# Patient Record
Sex: MALE | Race: WHITE | HISPANIC OR LATINO | URBAN - METROPOLITAN AREA
[De-identification: names, ages, dates, MRNs, and addresses within clinical notes are randomized per-mention and may not be internally consistent; named-entity substitution may affect disease eponyms.]

---

## 2020-09-03 ENCOUNTER — OFFICE VISIT (OUTPATIENT)
Dept: URBAN - METROPOLITAN AREA CLINIC 63 | Facility: CLINIC | Age: 50
End: 2020-09-03

## 2020-09-04 ENCOUNTER — OFFICE VISIT (OUTPATIENT)
Dept: URBAN - METROPOLITAN AREA CLINIC 63 | Facility: CLINIC | Age: 50
End: 2020-09-04

## 2021-04-20 ENCOUNTER — OFFICE VISIT (OUTPATIENT)
Dept: URBAN - METROPOLITAN AREA CLINIC 63 | Facility: CLINIC | Age: 51
End: 2021-04-20

## 2021-04-28 LAB
BASO (ABSOLUTE): (no result)
BASOS: (no result)
EOS (ABSOLUTE): (no result)
EOS: (no result)
FERRITIN, SERUM: (no result)
FOLATE (FOLIC ACID), SERUM: (no result)
HEMATOCRIT: (no result)
HEMATOLOGY COMMENTS:: (no result)
HEMOGLOBIN: (no result)
HEP B CORE AB, IGM: NEGATIVE
IMMATURE CELLS: (no result)
IMMATURE GRANS (ABS): (no result)
IMMATURE GRANULOCYTES: (no result)
IRON BIND.CAP.(TIBC): (no result)
IRON SATURATION: (no result)
IRON: (no result)
LYMPHS (ABSOLUTE): (no result)
LYMPHS: (no result)
Lab: (no result)
MCH: (no result)
MCHC: (no result)
MCV: (no result)
MONOCYTES(ABSOLUTE): (no result)
MONOCYTES: (no result)
NEUTROPHILS (ABSOLUTE): (no result)
NEUTROPHILS: (no result)
NRBC: (no result)
PLATELETS: (no result)
QUANTIFERON CRITERIA: (no result)
QUANTIFERON INCUBATION: (no result)
QUANTIFERON MITOGEN VALUE: (no result)
QUANTIFERON NIL VALUE: (no result)
QUANTIFERON TB1 AG VALUE: (no result)
QUANTIFERON TB2 AG VALUE: (no result)
QUANTIFERON-TB GOLD PLUS: NEGATIVE
RBC: (no result)
RDW: (no result)
UIBC: (no result)
VITAMIN B12: (no result)
WBC: (no result)

## 2021-05-03 ENCOUNTER — LAB OUTSIDE AN ENCOUNTER (OUTPATIENT)
Dept: URBAN - METROPOLITAN AREA CLINIC 121 | Facility: CLINIC | Age: 51
End: 2021-05-03

## 2021-05-08 LAB
C DIFFICILE TOXINS A+B, EIA: NEGATIVE
CALPROTECTIN, FECAL: (no result)
CAMPYLOBACTER CULTURE: (no result)
E COLI SHIGA TOXIN EIA: NEGATIVE
Lab: (no result)
OVA + PARASITE EXAM: (no result)
SALMONELLA/SHIGELLA SCREEN: (no result)
WHITE BLOOD CELLS (WBC), STOOL: (no result)

## 2021-06-14 ENCOUNTER — OFFICE VISIT (OUTPATIENT)
Dept: URBAN - METROPOLITAN AREA SURGERY CENTER 4 | Facility: SURGERY CENTER | Age: 51
End: 2021-06-14

## 2021-06-16 LAB — PATHOLOGY (INDENTED REPORT): (no result)

## 2021-08-06 ENCOUNTER — OFFICE VISIT (OUTPATIENT)
Dept: URBAN - METROPOLITAN AREA CLINIC 63 | Facility: CLINIC | Age: 51
End: 2021-08-06

## 2021-09-17 ENCOUNTER — OFFICE VISIT (OUTPATIENT)
Dept: URBAN - METROPOLITAN AREA CLINIC 63 | Facility: CLINIC | Age: 51
End: 2021-09-17

## 2021-10-22 ENCOUNTER — OFFICE VISIT (OUTPATIENT)
Dept: URBAN - METROPOLITAN AREA CLINIC 63 | Facility: CLINIC | Age: 51
End: 2021-10-22

## 2021-12-20 LAB
A/G RATIO: 1.8
ALBUMIN: (no result)
ALKALINE PHOSPHATASE: (no result)
ALT (SGPT): (no result)
ANTI-USTEKINUMAB ANTIBODY: (no result)
AST (SGOT): (no result)
BASO (ABSOLUTE): (no result)
BASOS: (no result)
BILIRUBIN, TOTAL: (no result)
BUN/CREATININE RATIO: 13
BUN: (no result)
C-REACTIVE PROTEIN, QUANT: (no result)
CALCIUM: (no result)
CARBON DIOXIDE, TOTAL: (no result)
CHLORIDE: (no result)
CREATININE: (no result)
EGFR IF AFRICN AM: (no result)
EGFR IF NONAFRICN AM: (no result)
EOS (ABSOLUTE): (no result)
EOS: (no result)
GLOBULIN, TOTAL: (no result)
GLUCOSE: (no result)
HEMATOCRIT: (no result)
HEMATOLOGY COMMENTS:: (no result)
HEMOGLOBIN: (no result)
IMMATURE CELLS: (no result)
IMMATURE GRANS (ABS): (no result)
IMMATURE GRANULOCYTES: (no result)
LYMPHS (ABSOLUTE): (no result)
LYMPHS: (no result)
MCH: (no result)
MCHC: (no result)
MCV: (no result)
MONOCYTES(ABSOLUTE): (no result)
MONOCYTES: (no result)
NEUTROPHILS (ABSOLUTE): (no result)
NEUTROPHILS: (no result)
NRBC: (no result)
PLATELETS: (no result)
POTASSIUM: (no result)
PROTEIN, TOTAL: (no result)
RBC: (no result)
RDW: (no result)
SODIUM: (no result)
USTEKINUMAB: (no result)
WBC: (no result)

## 2022-01-07 ENCOUNTER — OFFICE VISIT (OUTPATIENT)
Dept: URBAN - METROPOLITAN AREA CLINIC 63 | Facility: CLINIC | Age: 52
End: 2022-01-07

## 2022-02-17 LAB
BASO (ABSOLUTE): (no result)
BASOS: (no result)
EOS (ABSOLUTE): (no result)
EOS: (no result)
FERRITIN: (no result)
FOLATE (FOLIC ACID), SERUM: (no result)
HEMATOCRIT: (no result)
HEMATOLOGY COMMENTS:: (no result)
HEMOGLOBIN: (no result)
IMMATURE CELLS: (no result)
IMMATURE GRANS (ABS): (no result)
IMMATURE GRANULOCYTES: (no result)
IRON BIND.CAP.(TIBC): (no result)
IRON SATURATION: (no result)
IRON: (no result)
LYMPHS (ABSOLUTE): (no result)
LYMPHS: (no result)
MCH: (no result)
MCHC: (no result)
MCV: (no result)
MONOCYTES(ABSOLUTE): (no result)
MONOCYTES: (no result)
NEUTROPHILS (ABSOLUTE): (no result)
NEUTROPHILS: (no result)
NRBC: (no result)
PLATELETS: (no result)
RBC: (no result)
RDW: (no result)
UIBC: (no result)
VITAMIN B12: (no result)
WBC: (no result)

## 2022-02-18 ENCOUNTER — OFFICE VISIT (OUTPATIENT)
Dept: URBAN - METROPOLITAN AREA CLINIC 63 | Facility: CLINIC | Age: 52
End: 2022-02-18

## 2022-07-09 ENCOUNTER — TELEPHONE ENCOUNTER (OUTPATIENT)
Dept: URBAN - METROPOLITAN AREA CLINIC 121 | Facility: CLINIC | Age: 52
End: 2022-07-09

## 2022-07-09 RX ORDER — LACTOBACILLUS COMBINATION NO.4 3B CELL
CAPSULE ORAL
Refills: 0 | OUTPATIENT
Start: 2014-06-02 | End: 2015-09-08

## 2022-07-09 RX ORDER — TRAMADOL HYDROCHLORIDE 50 MG/1
TABLET ORAL AS NEEDED
Refills: 0 | OUTPATIENT
Start: 2015-11-04 | End: 2016-05-18

## 2022-07-09 RX ORDER — PANCRELIPASE LIPASE, PANCRELIPASE PROTEASE, PANCRELIPASE AMYLASE 40000; 126000; 168000 [USP'U]/1; [USP'U]/1; [USP'U]/1
USE AS DIRECTED TAKE 2 CAPSULES WITH EACH MEAL AND 1 WITH SNACKS UP TO 8 CAPSULES PER DAY CAPSULE, DELAYED RELEASE ORAL
Refills: 2 | OUTPATIENT
Start: 2020-01-09 | End: 2020-09-04

## 2022-07-09 RX ORDER — LISINOPRIL 10 MG/1
TABLET ORAL AS NEEDED
Refills: 0 | OUTPATIENT
Start: 2021-04-20 | End: 2021-08-06

## 2022-07-09 RX ORDER — ONDANSETRON HYDROCHLORIDE 4 MG/1
THREE TIMES A DAY AS NEEDED FOR NAUSEA TABLET, FILM COATED ORAL THREE TIMES A DAY
Refills: 2 | OUTPATIENT
Start: 2021-04-20 | End: 2022-02-21

## 2022-07-09 RX ORDER — TRAMADOL HYDROCHLORIDE 50 MG/1
TABLET ORAL AS NEEDED
Refills: 0 | OUTPATIENT
Start: 2016-05-18 | End: 2021-04-20

## 2022-07-09 RX ORDER — MESALAMINE 1.2 G/1
2 TWICE A DAY TABLET, DELAYED RELEASE ORAL
Refills: 2 | OUTPATIENT
Start: 2021-04-20 | End: 2021-08-06

## 2022-07-09 RX ORDER — LISINOPRIL 10 MG/1
TABLET ORAL AS NEEDED
Refills: 0 | OUTPATIENT
Start: 2021-08-06 | End: 2021-09-17

## 2022-07-09 RX ORDER — TRAMADOL HYDROCHLORIDE 50 MG/1
TABLET ORAL AS NEEDED
Refills: 0 | OUTPATIENT
Start: 2021-08-06 | End: 2021-09-17

## 2022-07-09 RX ORDER — USTEKINUMAB 90 MG/ML
USE AS DIRECTEDINJ 1-90MG PFS SC EVERY 8 WEEKS INJECTION, SOLUTION SUBCUTANEOUS
Refills: 4 | OUTPATIENT
Start: 2021-08-09 | End: 2022-01-10

## 2022-07-09 RX ORDER — ADALIMUMAB 40MG/0.8ML
USE AS DIRECTED KIT SUBCUTANEOUS
Refills: 6 | OUTPATIENT
Start: 2016-06-29 | End: 2016-09-14

## 2022-07-09 RX ORDER — ADALIMUMAB 40MG/0.8ML
INJECT EVERY OTHER WEEK KIT SUBCUTANEOUS
Refills: 3 | OUTPATIENT
Start: 2015-10-28 | End: 2015-11-04

## 2022-07-09 RX ORDER — MELOXICAM 15 MG/1
ONCE A DAY TABLET ORAL ONCE A DAY
Refills: 0 | OUTPATIENT
Start: 2017-03-08 | End: 2017-03-08

## 2022-07-09 RX ORDER — DICYCLOMINE HYDROCHLORIDE 20 MG/1
TAKE 1 TABLET BY MOUTH THREE TIMES DAILY TABLET ORAL
Refills: 11 | OUTPATIENT
Start: 2020-11-09 | End: 2021-11-17

## 2022-07-09 RX ORDER — TRAMADOL HYDROCHLORIDE 50 MG/1
TABLET ORAL AS NEEDED
Refills: 0 | OUTPATIENT
Start: 2021-09-17 | End: 2021-10-22

## 2022-07-09 RX ORDER — USTEKINUMAB 90 MG/ML
USE AS DIRECTEDINJ 1-90MG PFS SC EVERY 4  WEEKS INJECTION, SOLUTION SUBCUTANEOUS
Refills: 6 | OUTPATIENT
Start: 2022-01-10 | End: 2022-01-11

## 2022-07-09 RX ORDER — TRAMADOL HYDROCHLORIDE 50 MG/1
TABLET ORAL TAKE AS DIRECTED
Refills: 0 | OUTPATIENT
Start: 2015-01-22 | End: 2015-09-08

## 2022-07-09 RX ORDER — ADALIMUMAB 40MG/0.8ML
ONE INJECTION EVERY OTHER WEEK KIT SUBCUTANEOUS
Refills: 3 | OUTPATIENT
Start: 2015-10-28 | End: 2015-10-28

## 2022-07-09 RX ORDER — LISINOPRIL 10 MG/1
TABLET ORAL AS NEEDED
Refills: 0 | OUTPATIENT
Start: 2021-10-22 | End: 2022-01-07

## 2022-07-09 RX ORDER — PREDNISONE 10 MG/1
TAKE 2 TAB/DAYWHEN INSTRUCTED TO BEGIN TAPER: TAKE 1 TAB DAILY X 7 DAYS, THEN 1/2 TAB X 7 DAYS, THEN 1/2 TAB EVERY OTHER DAY X 10 DAYS TABLET ORAL
Refills: 0 | OUTPATIENT
Start: 2017-03-31 | End: 2019-10-08

## 2022-07-09 RX ORDER — VITAM B12 100 MCG
TAB ORAL ONCE A DAY
Refills: 0 | OUTPATIENT
Start: 2021-10-22 | End: 2022-01-07

## 2022-07-09 RX ORDER — LACTOBACILLUS COMBINATION NO.4 3B CELL
CAPSULE ORAL ONCE A DAY
Refills: 0 | OUTPATIENT
Start: 2015-09-08 | End: 2015-11-04

## 2022-07-09 RX ORDER — TRAMADOL HYDROCHLORIDE 50 MG/1
TABLET ORAL AS NEEDED
Refills: 0 | OUTPATIENT
Start: 2021-04-20 | End: 2021-08-06

## 2022-07-09 RX ORDER — PREDNISONE 10 MG/1
CONTINUE TAPER 3 TAB/DAY FOR 7 DAYS, 2 TAB/DAY FOR 7 DAYS, 1 TAB/DAY FOR 7 DAYS, 1/2 TAB/DAY FOR 7 DAYS TABLET ORAL
Refills: 0 | OUTPATIENT
Start: 2016-10-18 | End: 2019-10-08

## 2022-07-09 RX ORDER — ASPIRIN/ACETAMINOPHEN/CAFFEINE 250-250-65
TABLET ORAL ONCE A DAY
Refills: 0 | OUTPATIENT
Start: 2016-05-18 | End: 2016-05-18

## 2022-07-09 RX ORDER — TIZANIDINE 2 MG/1
TABLET ORAL ONCE A DAY
Refills: 0 | OUTPATIENT
Start: 2021-12-27 | End: 2022-01-07

## 2022-07-09 RX ORDER — PREDNISONE 10 MG/1
4 TAB(40 MG) PO DAILY FOR 1 WEEK AND 3 TABS (30 MG ) PO DAILY FOR 1 WEEK AND 2 TABS(20 MG) PO DAILY FOR 1 WEEK AND THEN TAPER DOWN 5 MG EVERY WEEK AS DIRECTED TABLET ORAL
Refills: 3 | OUTPATIENT
Start: 2014-06-12 | End: 2015-01-22

## 2022-07-09 RX ORDER — TRAMADOL HYDROCHLORIDE 50 MG/1
TABLET ORAL AS NEEDED
Refills: 0 | OUTPATIENT
Start: 2021-10-22 | End: 2022-01-07

## 2022-07-09 RX ORDER — PREDNISONE 10 MG/1
TAKE 3 TABS/DAY X 1 WEEK, 2 TABS/DAY X 1 WEEK, 1 TABS/DAY X 1 WEEK TABLET ORAL
Refills: 0 | OUTPATIENT
Start: 2017-02-08 | End: 2017-03-03

## 2022-07-09 RX ORDER — ONDANSETRON HYDROCHLORIDE 4 MG/1
THREE TIMES A DAY AS NEEDED FOR NAUSEA TABLET, FILM COATED ORAL THREE TIMES A DAY
Refills: 11 | OUTPATIENT
Start: 2020-03-30 | End: 2021-04-15

## 2022-07-09 RX ORDER — PREDNISONE 20 MG/1
TABLET ORAL TWICE A DAY
Refills: 0 | OUTPATIENT
Start: 2015-01-22 | End: 2015-02-19

## 2022-07-09 RX ORDER — VITAM B12 100 MCG
TAB ORAL ONCE A DAY
Refills: 0 | OUTPATIENT
Start: 2021-09-17 | End: 2021-10-22

## 2022-07-09 RX ORDER — DICYCLOMINE HYDROCHLORIDE 20 MG/1
THREE TIMES A DAY TABLET ORAL THREE TIMES A DAY
Refills: 2 | OUTPATIENT
Start: 2019-11-08 | End: 2020-11-09

## 2022-07-09 RX ORDER — LISINOPRIL 10 MG/1
TABLET ORAL
Refills: 0 | OUTPATIENT
Start: 2019-10-08 | End: 2020-09-04

## 2022-07-09 RX ORDER — VEDOLIZUMAB 300 MG/5ML
INFUSED Q8 WEEKS INJECTION, POWDER, LYOPHILIZED, FOR SOLUTION INTRAVENOUS
Refills: 0 | OUTPATIENT
Start: 2017-01-18 | End: 2019-10-08

## 2022-07-09 RX ORDER — ONDANSETRON HYDROCHLORIDE 4 MG/1
THREE TIMES A DAY AS NEEDED FOR NAUSEA TABLET, FILM COATED ORAL THREE TIMES A DAY
Refills: 2 | OUTPATIENT
Start: 2019-11-08 | End: 2020-03-30

## 2022-07-09 RX ORDER — PREDNISONE 10 MG/1
TABLET ORAL
Refills: 0 | OUTPATIENT
Start: 2014-06-02 | End: 2015-01-22

## 2022-07-09 RX ORDER — ADALIMUMAB 40MG/0.8ML
INJECT 1 PEN SQ BI-WEEKLY KIT SUBCUTANEOUS TAKE AS DIRECTED
Refills: 3 | OUTPATIENT
Start: 2016-02-18 | End: 2016-05-18

## 2022-07-09 RX ORDER — LACTOBACILLUS COMBINATION NO.4 3B CELL
CAPSULE ORAL ONCE A DAY
Refills: 0 | OUTPATIENT
Start: 2015-11-04 | End: 2016-05-18

## 2022-07-09 RX ORDER — TIZANIDINE 2 MG/1
TABLET ORAL
Refills: 0 | OUTPATIENT
Start: 2021-10-10 | End: 2021-10-22

## 2022-07-09 RX ORDER — ASPIRIN/ACETAMINOPHEN/CAFFEINE 250-250-65
TABLET ORAL ONCE A DAY
Refills: 0 | OUTPATIENT
Start: 2015-11-04 | End: 2016-05-18

## 2022-07-09 RX ORDER — VITAM B12 100 MCG
TAB ORAL ONCE A DAY
Refills: 0 | OUTPATIENT
Start: 2021-04-20 | End: 2021-08-06

## 2022-07-09 RX ORDER — VITAM B12 100 MCG
TAB ORAL ONCE A DAY
Refills: 0 | OUTPATIENT
Start: 2021-08-06 | End: 2021-09-17

## 2022-07-09 RX ORDER — PREDNISONE 10 MG/1
4 TABLETS PER DAY TABLET ORAL TAKE AS DIRECTED
Refills: 0 | OUTPATIENT
Start: 2016-09-28 | End: 2016-10-18

## 2022-07-09 RX ORDER — METHOTREXATE 2.5 MG/1
3 ONCE A WEEKDO NOT TAKE FOLIC ACID ON DAY OF ADMINISTRATION TABLET ORAL
Refills: 2 | OUTPATIENT
Start: 2017-03-31 | End: 2019-10-08

## 2022-07-09 RX ORDER — ADALIMUMAB 40MG/0.8ML
KIT SUBCUTANEOUS
Refills: 0 | OUTPATIENT
Start: 2015-11-04 | End: 2016-05-18

## 2022-07-09 RX ORDER — LACTOBACILLUS COMBINATION NO.4 3B CELL
CAPSULE ORAL ONCE A DAY
Refills: 0 | OUTPATIENT
Start: 2016-05-18 | End: 2019-10-08

## 2022-07-09 RX ORDER — ONDANSETRON HYDROCHLORIDE 4 MG/1
TAKE 1 TABLET BY MOUTH 3 TIMES A DAY AS NEEDED FOR NAUSEA TABLET, FILM COATED ORAL
Refills: 0 | OUTPATIENT
Start: 2021-12-27 | End: 2022-02-01

## 2022-07-09 RX ORDER — ONDANSETRON HYDROCHLORIDE 4 MG/1
TAKE 1 TABLET BY MOUTH 3 TIMES A DAY AS NEEDED FOR NAUSEA TABLET, FILM COATED ORAL
Refills: 0 | OUTPATIENT
Start: 2022-02-01 | End: 2022-03-07

## 2022-07-09 RX ORDER — VITAM B12 100 MCG
TAB ORAL
Refills: 0 | OUTPATIENT
Start: 2020-09-04 | End: 2021-04-20

## 2022-07-09 RX ORDER — ADALIMUMAB 40MG/0.8ML
KIT SUBCUTANEOUS
Refills: 0 | OUTPATIENT
Start: 2016-05-18 | End: 2017-01-18

## 2022-07-09 RX ORDER — TRAMADOL HYDROCHLORIDE 50 MG/1
TABLET ORAL AS NEEDED
Refills: 0 | OUTPATIENT
Start: 2015-09-08 | End: 2015-11-04

## 2022-07-09 RX ORDER — ONDANSETRON HYDROCHLORIDE 4 MG/1
TAKE 1 TABLET BY MOUTH 3 TIMES A DAY AS NEEDED FOR NAUSEA TABLET, FILM COATED ORAL
Refills: 0 | OUTPATIENT
Start: 2021-04-15 | End: 2021-12-27

## 2022-07-09 RX ORDER — LISINOPRIL 10 MG/1
TABLET ORAL AS NEEDED
Refills: 0 | OUTPATIENT
Start: 2021-09-17 | End: 2021-10-22

## 2022-07-09 RX ORDER — ASPIRIN/ACETAMINOPHEN/CAFFEINE 250-250-65
TABLET ORAL ONCE A DAY
Refills: 0 | OUTPATIENT
Start: 2015-09-08 | End: 2015-11-04

## 2022-07-09 RX ORDER — ASPIRIN/ACETAMINOPHEN/CAFFEINE 250-250-65
TABLET ORAL
Refills: 0 | OUTPATIENT
Start: 2015-07-22 | End: 2015-09-08

## 2022-07-10 ENCOUNTER — TELEPHONE ENCOUNTER (OUTPATIENT)
Dept: URBAN - METROPOLITAN AREA CLINIC 121 | Facility: CLINIC | Age: 52
End: 2022-07-10

## 2022-07-10 RX ORDER — PREDNISONE 10 MG/1
TAKE 3 TABLET BY MOUTH EVERY DAY FOR 1 WEEK, TAKE 2 TABLET BY MOUTH EVERY DAY FOR 1 WEEK, TAKE 1 TABLET BY MOUTH EVERY DAY FOR 1 WEEK TABLET ORAL
Refills: 0 | Status: ACTIVE | COMMUNITY
Start: 2017-03-03

## 2022-07-10 RX ORDER — USTEKINUMAB 90 MG/ML
ONCE EVERY 4 WEEKS INJECTION, SOLUTION SUBCUTANEOUS
Refills: 0 | Status: ACTIVE | COMMUNITY
Start: 2022-01-07

## 2022-07-10 RX ORDER — USTEKINUMAB 90 MG/ML
USE AS DIRECTEDINJ 1-90MG PFS SC EVERY 4  WEEKS INJECTION, SOLUTION SUBCUTANEOUS
Refills: 6 | Status: ACTIVE | COMMUNITY
Start: 2022-01-11

## 2022-07-10 RX ORDER — ONDANSETRON HYDROCHLORIDE 4 MG/1
TAKE 1 TABLET BY MOUTH 3 TIMES A DAY AS NEEDED FOR NAUSEA TABLET, FILM COATED ORAL
Refills: 6 | Status: ACTIVE | COMMUNITY
Start: 2022-03-07

## 2022-07-10 RX ORDER — BUDESONIDE 3 MG/1
3 ONCE A DAY CAPSULE, COATED PELLETS ORAL
Refills: 1 | Status: ACTIVE | COMMUNITY
Start: 2021-09-17

## 2022-07-10 RX ORDER — MELOXICAM 15 MG/1
ONCE A DAY TABLET ORAL ONCE A DAY
Refills: 0 | Status: ACTIVE | COMMUNITY
Start: 2017-03-08

## 2022-07-10 RX ORDER — TIZANIDINE 2 MG/1
TABLET ORAL AS NEEDED
Refills: 0 | Status: ACTIVE | COMMUNITY
Start: 2021-10-22

## 2022-07-10 RX ORDER — TRAMADOL HYDROCHLORIDE 50 MG/1
TABLET ORAL AS NEEDED
Refills: 0 | Status: ACTIVE | COMMUNITY
Start: 2022-01-07

## 2022-07-10 RX ORDER — LISINOPRIL 10 MG/1
TABLET ORAL AS NEEDED
Refills: 0 | Status: ACTIVE | COMMUNITY
Start: 2022-01-07

## 2022-07-10 RX ORDER — DICYCLOMINE HYDROCHLORIDE 20 MG/1
THREE TIMES A DAY AS NEEDED FOR ABDOMINAL PAIN TABLET ORAL THREE TIMES A DAY
Refills: 3 | Status: ACTIVE | COMMUNITY
Start: 2021-11-17

## 2022-07-10 RX ORDER — ONDANSETRON HYDROCHLORIDE 4 MG/1
TAKE 1 TABLET BY MOUTH THREE TIMES DAILY AS NEEDED FOR NAUSEA TABLET, FILM COATED ORAL
Refills: 6 | Status: ACTIVE | COMMUNITY
Start: 2022-02-21

## 2022-07-10 RX ORDER — TIZANIDINE 2 MG/1
TABLET ORAL ONCE A DAY
Refills: 0 | Status: ACTIVE | COMMUNITY
Start: 2022-01-07

## 2022-07-10 RX ORDER — VITAM B12 100 MCG
TAB ORAL ONCE A DAY
Refills: 0 | Status: ACTIVE | COMMUNITY
Start: 2022-01-07

## 2022-07-30 ENCOUNTER — TELEPHONE ENCOUNTER (OUTPATIENT)
Age: 52
End: 2022-07-30

## 2022-07-30 RX ORDER — PREDNISONE 10 MG/1
1 TABLET ORAL
Qty: 0 | Refills: 2 | OUTPATIENT
Start: 2013-07-10 | End: 2013-08-09

## 2022-07-30 RX ORDER — PREDNISONE 10 MG/1
1 TABLET ORAL
Qty: 0 | Refills: 2 | OUTPATIENT
Start: 2012-09-20 | End: 2012-10-20

## 2022-07-30 RX ORDER — PREDNISONE 10 MG/1
1 TABLET ORAL
Qty: 0 | Refills: 2 | OUTPATIENT
Start: 2012-04-03 | End: 2012-05-03

## 2022-07-30 RX ORDER — PREDNISONE 10 MG/1
1 TABLET ORAL
Qty: 0 | Refills: 2 | OUTPATIENT
Start: 2011-05-10 | End: 2011-06-09

## 2022-07-30 RX ORDER — FERROUS SULFATE 325(65) MG
1 TABLET ORAL
Qty: 0 | Refills: 2 | OUTPATIENT
Start: 2012-09-19 | End: 2012-10-19

## 2022-07-30 RX ORDER — ESOMEPRAZOLE MAGNESIUM 40 MG
1 (ONE) CAPSULE,DELAYED RELEASE (ENTERIC COATED) ORAL DAILY
Qty: 0 | Refills: 2 | OUTPATIENT
Start: 2012-12-21 | End: 2013-01-05

## 2022-07-30 RX ORDER — MERCAPTOPURINE 50 MG/1
1 TABLET ORAL
Qty: 0 | Refills: 16 | OUTPATIENT
Start: 2011-05-11 | End: 2012-09-20

## 2022-07-31 ENCOUNTER — TELEPHONE ENCOUNTER (OUTPATIENT)
Age: 52
End: 2022-07-31

## 2022-07-31 RX ORDER — ESOMEPRAZOLE MAGNESIUM 40 MG
1 (ONE) CAPSULE,DELAYED RELEASE (ENTERIC COATED) ORAL DAILY
Qty: 0 | Refills: 2 | Status: ACTIVE | COMMUNITY
Start: 2012-12-21

## 2022-07-31 RX ORDER — FERROUS SULFATE 325(65) MG
1 TABLET ORAL
Qty: 0 | Refills: 2 | Status: ACTIVE | COMMUNITY
Start: 2012-09-19

## 2022-07-31 RX ORDER — PREDNISONE 10 MG/1
1 TABLET ORAL
Qty: 0 | Refills: 2 | Status: ACTIVE | COMMUNITY
Start: 2012-09-20

## 2022-07-31 RX ORDER — PREDNISONE 10 MG/1
1 TABLET ORAL
Qty: 0 | Refills: 2 | Status: ACTIVE | COMMUNITY
Start: 2013-07-10

## 2022-07-31 RX ORDER — CYANOCOBALAMIN 1000 UG/ML
1 (ONE) INJECTION INTRAMUSCULAR; SUBCUTANEOUS
Qty: 0 | Refills: 5 | Status: ACTIVE | COMMUNITY
Start: 2014-01-20

## 2022-07-31 RX ORDER — MERCAPTOPURINE 50 MG/1
1 TABLET ORAL
Qty: 0 | Refills: 16 | Status: ACTIVE | COMMUNITY
Start: 2011-05-11

## 2022-07-31 RX ORDER — PREDNISONE 10 MG/1
1 TABLET ORAL
Qty: 0 | Refills: 2 | Status: ACTIVE | COMMUNITY
Start: 2011-05-10

## 2022-07-31 RX ORDER — ONDANSETRON 8 MG/1
1 TABLET, ORALLY DISINTEGRATING ORAL
Qty: 0 | Refills: 3 | Status: ACTIVE | COMMUNITY
Start: 2012-12-21

## 2022-07-31 RX ORDER — PREDNISONE 10 MG/1
1 TABLET ORAL
Qty: 0 | Refills: 2 | Status: ACTIVE | COMMUNITY
Start: 2012-04-03

## 2022-12-02 ENCOUNTER — TELEPHONE ENCOUNTER (OUTPATIENT)
Dept: URBAN - METROPOLITAN AREA CLINIC 63 | Facility: CLINIC | Age: 52
End: 2022-12-02

## 2022-12-02 PROBLEM — 7620006: Status: ACTIVE | Noted: 2022-12-02

## 2022-12-10 ENCOUNTER — LAB OUTSIDE AN ENCOUNTER (OUTPATIENT)
Dept: URBAN - METROPOLITAN AREA CLINIC 63 | Facility: CLINIC | Age: 52
End: 2022-12-10

## 2022-12-14 LAB
A/G RATIO: 1.6
ALBUMIN: 4.2
ALKALINE PHOSPHATASE: 74
ALT (SGPT): 18
AMBIG ABBREV CMP14 DEFAULT: (no result)
AST (SGOT): 14
BASO (ABSOLUTE): 0
BASOS: 1
BILIRUBIN, TOTAL: 1.1
BUN/CREATININE RATIO: 12
BUN: 11
C-REACTIVE PROTEIN, QUANT: <1
CALCIUM: 9
CARBON DIOXIDE, TOTAL: 25
CHLORIDE: 102
CREATININE: 0.93
EGFR: 99
EOS (ABSOLUTE): 0
EOS: 0
FOLATE (FOLIC ACID), SERUM: 5.9
GLOBULIN, TOTAL: 2.6
GLUCOSE: 88
HBSAG SCREEN: NEGATIVE
HEMATOCRIT: 48.7
HEMATOLOGY COMMENTS:: (no result)
HEMOGLOBIN: 16.5
HEP B CORE AB, TOT: NEGATIVE
IMMATURE CELLS: (no result)
IMMATURE GRANS (ABS): 0
IMMATURE GRANULOCYTES: 0
IRON BIND.CAP.(TIBC): 331
IRON SATURATION: 27
IRON: 89
LYMPHS (ABSOLUTE): 1
LYMPHS: 20
MCH: 30
MCHC: 33.9
MCV: 89
MONOCYTES(ABSOLUTE): 0.2
MONOCYTES: 5
NEUTROPHILS (ABSOLUTE): 3.5
NEUTROPHILS: 74
NRBC: (no result)
PLATELETS: 125
POTASSIUM: 3.8
PROTEIN, TOTAL: 6.8
QUANTIFERON CRITERIA: (no result)
QUANTIFERON INCUBATION: (no result)
QUANTIFERON MITOGEN VALUE: >10
QUANTIFERON NIL VALUE: 0.04
QUANTIFERON TB1 AG VALUE: 0.04
QUANTIFERON TB2 AG VALUE: 0.04
QUANTIFERON-TB GOLD PLUS: NEGATIVE
RBC: 5.5
RDW: 12.8
SODIUM: 141
UIBC: 242
VITAMIN B12: 665
VITAMIN D, 25-HYDROXY: 25.6
WBC: 4.8

## 2023-01-06 ENCOUNTER — OFFICE VISIT (OUTPATIENT)
Dept: URBAN - METROPOLITAN AREA CLINIC 63 | Facility: CLINIC | Age: 53
End: 2023-01-06
Payer: COMMERCIAL

## 2023-01-06 ENCOUNTER — WEB ENCOUNTER (OUTPATIENT)
Dept: URBAN - METROPOLITAN AREA CLINIC 63 | Facility: CLINIC | Age: 53
End: 2023-01-06

## 2023-01-06 VITALS
WEIGHT: 193 LBS | TEMPERATURE: 98.1 F | HEART RATE: 69 BPM | HEIGHT: 72 IN | OXYGEN SATURATION: 95 % | BODY MASS INDEX: 26.14 KG/M2 | SYSTOLIC BLOOD PRESSURE: 120 MMHG | DIASTOLIC BLOOD PRESSURE: 90 MMHG

## 2023-01-06 DIAGNOSIS — Z92.25 PERSONAL HISTORY OF IMMUNOSUPPRESSION THERAPY: ICD-10-CM

## 2023-01-06 DIAGNOSIS — K50.00 CROHN'S DISEASE OF ILEUM WITHOUT COMPLICATION: ICD-10-CM

## 2023-01-06 PROBLEM — 38106008: Status: ACTIVE | Noted: 2023-01-06

## 2023-01-06 PROBLEM — 161651005: Status: ACTIVE | Noted: 2023-01-06

## 2023-01-06 PROCEDURE — 99213 OFFICE O/P EST LOW 20 MIN: CPT | Performed by: NURSE PRACTITIONER

## 2023-01-06 RX ORDER — CYANOCOBALAMIN 1000 UG/ML
1 (ONE) INJECTION INTRAMUSCULAR; SUBCUTANEOUS
Qty: 0 | Refills: 5 | Status: ACTIVE | COMMUNITY
Start: 2014-01-20

## 2023-01-06 RX ORDER — ONDANSETRON HYDROCHLORIDE 4 MG/1
TAKE 1 TABLET BY MOUTH 3 TIMES A DAY AS NEEDED FOR NAUSEA TABLET, FILM COATED ORAL
Refills: 6 | Status: ACTIVE | COMMUNITY
Start: 2022-03-07

## 2023-01-06 RX ORDER — LOSARTAN POTASSIUM 25 MG/1
TAKE 1 TABLET BY MOUTH EVERY DAY TABLET, FILM COATED ORAL
Qty: 90 EACH | Refills: 0 | Status: ACTIVE | COMMUNITY

## 2023-01-06 RX ORDER — USTEKINUMAB 90 MG/ML
USE AS DIRECTEDINJ 1-90MG PFS SC EVERY 4  WEEKS INJECTION, SOLUTION SUBCUTANEOUS
Refills: 6 | Status: ACTIVE | COMMUNITY
Start: 2022-01-11

## 2023-01-06 RX ORDER — TRAMADOL HYDROCHLORIDE 50 MG/1
TABLET ORAL AS NEEDED
Refills: 0 | Status: ACTIVE | COMMUNITY
Start: 2022-01-07

## 2023-01-06 RX ORDER — VITAM B12 100 MCG
TAB ORAL ONCE A DAY
Refills: 0 | Status: ACTIVE | COMMUNITY
Start: 2022-01-07

## 2023-01-06 RX ORDER — TIZANIDINE 2 MG/1
TABLET ORAL ONCE A DAY
Refills: 0 | Status: ACTIVE | COMMUNITY
Start: 2022-01-07

## 2023-01-06 RX ORDER — FERROUS SULFATE 325(65) MG
1 TABLET ORAL
Qty: 0 | Refills: 2 | Status: ACTIVE | COMMUNITY
Start: 2012-09-19

## 2023-01-06 RX ORDER — DICYCLOMINE HYDROCHLORIDE 20 MG/1
THREE TIMES A DAY AS NEEDED FOR ABDOMINAL PAIN TABLET ORAL THREE TIMES A DAY
Refills: 3 | Status: ACTIVE | COMMUNITY
Start: 2021-11-17

## 2023-01-06 RX ORDER — MELOXICAM 15 MG/1
ONCE A DAY TABLET ORAL ONCE A DAY
Refills: 0 | Status: ACTIVE | COMMUNITY
Start: 2017-03-08

## 2023-01-06 NOTE — HPI-CROHN'S DISEASE
Last Colonoscopy:  2021 with us with mild ileitis with 4 erosions.   2018 with [facility], normal colon and danae TI, on Entyvio  some scar tissue at anorectum  Current IBD Meds: stelara 90 mg every 4 weeks  Prior IBD Meds: Asacol Remicade caused joint pains(probably due to ADAB) Humira with high antibody levels and failure to respond Received only 2 dose of entyvio and was stopped due to low platelets, his platelets have always run at the bottom end of normal per our labs.   Steroid use/response: budesonide end of   Most recent imagin2019 CTE shows mild hyperemia in the rectum, possible proctitis. Given hx of scarring at anorectum on last colonoscopy this explains the hyperemia.  Extraintestinal manifestations: none  Inflammatory Markers: 2022 crp wnl  Required  biologic testin2022 quant gold and hep b wnl  Risk factors for severe disease: young age at dx, prior surgery, male sex and small bowel disease  IBD surgical history:  resection 20 yrs ago and second surgery 1/15 for stricture and recurrent SBO  Personal history of cancer:  no  Cardiac history: none

## 2023-01-06 NOTE — HPI-HPI
2-3 loose bm per day which is his baseline likely due to multiple resections. Not using fiber or imodium Has occasional brbpr with several loose stools

## 2023-01-06 NOTE — PHYSICAL EXAM GASTROINTESTINAL
Abdomen , soft, RLQ tenderness, nondistended , no guarding or rigidity , no masses palpable , normal bowel sounds , Liver and Spleen , no hepatomegaly present , no hepatosplenomegaly , liver nontender , spleen not palpable, No Ascites, No hernia

## 2023-02-03 ENCOUNTER — ERX REFILL RESPONSE (OUTPATIENT)
Dept: URBAN - METROPOLITAN AREA CLINIC 63 | Facility: CLINIC | Age: 53
End: 2023-02-03

## 2023-02-03 RX ORDER — ONDANSETRON HYDROCHLORIDE 4 MG/1
TAKE 1 TABLET BY MOUTH THREE TIMES DAILY AS NEEDED FOR NAUSEA TABLET, FILM COATED ORAL
Qty: 90 TABLET | Refills: 0 | OUTPATIENT

## 2023-02-03 RX ORDER — ONDANSETRON HYDROCHLORIDE 4 MG/1
TAKE 1 TABLET BY MOUTH THREE TIMES DAILY AS NEEDED FOR NAUSEA TABLET, FILM COATED ORAL
Qty: 90 TABLET | Refills: 11 | OUTPATIENT

## 2023-03-27 ENCOUNTER — OFFICE VISIT (OUTPATIENT)
Dept: URBAN - METROPOLITAN AREA CLINIC 63 | Facility: CLINIC | Age: 53
End: 2023-03-27
Payer: COMMERCIAL

## 2023-03-27 VITALS
HEIGHT: 72 IN | OXYGEN SATURATION: 97 % | TEMPERATURE: 98.1 F | SYSTOLIC BLOOD PRESSURE: 130 MMHG | DIASTOLIC BLOOD PRESSURE: 80 MMHG | BODY MASS INDEX: 26.01 KG/M2 | WEIGHT: 192 LBS | HEART RATE: 84 BPM

## 2023-03-27 DIAGNOSIS — K50.10 CROHN'S DISEASE OF LARGE INTESTINE WITHOUT COMPLICATION: ICD-10-CM

## 2023-03-27 DIAGNOSIS — Z92.25 PERSONAL HISTORY OF IMMUNOSUPPRESSION THERAPY: ICD-10-CM

## 2023-03-27 PROCEDURE — 99213 OFFICE O/P EST LOW 20 MIN: CPT | Performed by: NURSE PRACTITIONER

## 2023-03-27 RX ORDER — LOSARTAN POTASSIUM 25 MG/1
TAKE 1 TABLET BY MOUTH EVERY DAY TABLET, FILM COATED ORAL
Qty: 90 EACH | Refills: 0 | Status: ACTIVE | COMMUNITY

## 2023-03-27 RX ORDER — TIZANIDINE 2 MG/1
TABLET ORAL ONCE A DAY
Refills: 0 | Status: ACTIVE | COMMUNITY
Start: 2022-01-07

## 2023-03-27 RX ORDER — MELOXICAM 15 MG/1
ONCE A DAY TABLET ORAL ONCE A DAY
Refills: 0 | Status: ACTIVE | COMMUNITY
Start: 2017-03-08

## 2023-03-27 RX ORDER — DICYCLOMINE HYDROCHLORIDE 20 MG/1
THREE TIMES A DAY AS NEEDED FOR ABDOMINAL PAIN TABLET ORAL THREE TIMES A DAY
Refills: 3 | Status: ACTIVE | COMMUNITY
Start: 2021-11-17

## 2023-03-27 RX ORDER — USTEKINUMAB 90 MG/ML
USE AS DIRECTEDINJ 1-90MG PFS SC EVERY 4  WEEKS INJECTION, SOLUTION SUBCUTANEOUS
Refills: 6 | Status: ACTIVE | COMMUNITY
Start: 2022-01-11

## 2023-03-27 RX ORDER — CYANOCOBALAMIN 1000 UG/ML
1 (ONE) INJECTION INTRAMUSCULAR; SUBCUTANEOUS
Qty: 0 | Refills: 5 | Status: ACTIVE | COMMUNITY
Start: 2014-01-20

## 2023-03-27 RX ORDER — TRAMADOL HYDROCHLORIDE 50 MG/1
TABLET ORAL AS NEEDED
Refills: 0 | Status: ACTIVE | COMMUNITY
Start: 2022-01-07

## 2023-03-27 RX ORDER — VITAM B12 100 MCG
TAB ORAL ONCE A DAY
Refills: 0 | Status: ACTIVE | COMMUNITY
Start: 2022-01-07

## 2023-03-27 RX ORDER — FERROUS SULFATE 325(65) MG
1 TABLET ORAL
Qty: 0 | Refills: 2 | Status: ACTIVE | COMMUNITY
Start: 2012-09-19

## 2023-03-27 NOTE — HPI-CROHN'S DISEASE
Diagnosed in  Last Colonoscopy:  2021 with us with mild ileitis with 4 erosions.   2018 with [facility], normal colon and danae TI, on Entyvio  some scar tissue at anorectum  Current IBD Meds: stelara 90 mg every 4 weeks  Prior IBD Meds: Asacol Remicade caused joint pains(probably due to ADAB) Humira with high antibody levels and failure to respond Received only 2 dose of entyvio and was stopped due to low platelets, his platelets have always run at the bottom end of normal per our labs.   Steroid use/response: budesonide end of   Most recent imagin2019 CTE shows mild hyperemia in the rectum, possible proctitis. Given hx of scarring at anorectum on last colonoscopy this explains the hyperemia.  Extraintestinal manifestations: none  Inflammatory Markers: 2022 crp wnl  Required  biologic testin2022 quant gold and hep b wnl  Risk factors for severe disease: young age at dx, prior surgery, male sex and small bowel disease  IBD surgical history:  resection 20 yrs ago and second surgery 1/15 for stricture and recurrent SBO  Personal history of cancer:  no  Cardiac history: none

## 2023-03-27 NOTE — HPI-TODAY'S VISIT:
3-4 loose bm per day which is his baseline likely due to multiple resections. Not using fiber or imodium Has occasional brbpr with several loose stools daily. Taking stelara q 4 weeks without issue and using patient assistance/insurance. Here today to discuss getting a letter for his wife for immigration purposes. He requires her assistance due to his permanent disability from crohn's disease.

## 2023-06-30 ENCOUNTER — OFFICE VISIT (OUTPATIENT)
Dept: URBAN - METROPOLITAN AREA CLINIC 63 | Facility: CLINIC | Age: 53
End: 2023-06-30
Payer: COMMERCIAL

## 2023-06-30 ENCOUNTER — LAB OUTSIDE AN ENCOUNTER (OUTPATIENT)
Dept: URBAN - METROPOLITAN AREA CLINIC 63 | Facility: CLINIC | Age: 53
End: 2023-06-30

## 2023-06-30 VITALS
WEIGHT: 205.6 LBS | OXYGEN SATURATION: 97 % | TEMPERATURE: 98.1 F | SYSTOLIC BLOOD PRESSURE: 126 MMHG | BODY MASS INDEX: 27.85 KG/M2 | HEART RATE: 82 BPM | DIASTOLIC BLOOD PRESSURE: 82 MMHG | HEIGHT: 72 IN

## 2023-06-30 DIAGNOSIS — K50.00 CROHN'S DISEASE OF ILEUM WITHOUT COMPLICATION: ICD-10-CM

## 2023-06-30 DIAGNOSIS — K50.10 CROHN'S DISEASE OF LARGE INTESTINE WITHOUT COMPLICATION: ICD-10-CM

## 2023-06-30 DIAGNOSIS — Z92.25 PERSONAL HISTORY OF IMMUNOSUPPRESSION THERAPY: ICD-10-CM

## 2023-06-30 PROCEDURE — 99214 OFFICE O/P EST MOD 30 MIN: CPT | Performed by: NURSE PRACTITIONER

## 2023-06-30 RX ORDER — FERROUS SULFATE 325(65) MG
1 TABLET ORAL
Qty: 0 | Refills: 2 | Status: ON HOLD | COMMUNITY
Start: 2012-09-19

## 2023-06-30 RX ORDER — TRAMADOL HYDROCHLORIDE 50 MG/1
TABLET ORAL AS NEEDED
Refills: 0 | Status: ACTIVE | COMMUNITY
Start: 2022-01-07

## 2023-06-30 RX ORDER — USTEKINUMAB 90 MG/ML
USE AS DIRECTEDINJ 1-90MG PFS SC EVERY 4  WEEKS INJECTION, SOLUTION SUBCUTANEOUS
Refills: 6 | Status: ACTIVE | COMMUNITY
Start: 2022-01-11

## 2023-06-30 RX ORDER — VITAM B12 100 MCG
TAB ORAL ONCE A DAY
Refills: 0 | Status: ACTIVE | COMMUNITY
Start: 2022-01-07

## 2023-06-30 RX ORDER — DICYCLOMINE HYDROCHLORIDE 20 MG/1
THREE TIMES A DAY AS NEEDED FOR ABDOMINAL PAIN TABLET ORAL THREE TIMES A DAY
Refills: 3 | Status: ACTIVE | COMMUNITY
Start: 2021-11-17

## 2023-06-30 RX ORDER — MELOXICAM 15 MG/1
ONCE A DAY TABLET ORAL ONCE A DAY
Refills: 0 | Status: ACTIVE | COMMUNITY
Start: 2017-03-08

## 2023-06-30 RX ORDER — TIZANIDINE 2 MG/1
TABLET ORAL ONCE A DAY
Refills: 0 | Status: ACTIVE | COMMUNITY
Start: 2022-01-07

## 2023-06-30 RX ORDER — LOSARTAN POTASSIUM 25 MG/1
TAKE 1 TABLET BY MOUTH EVERY DAY TABLET, FILM COATED ORAL
Qty: 90 EACH | Refills: 0 | Status: ACTIVE | COMMUNITY

## 2023-06-30 NOTE — PHYSICAL EXAM GASTROINTESTINAL
Abdomen , soft, tender under the umbilicus, nondistended , no guarding or rigidity , no masses palpable , normal bowel sounds , Liver and Spleen , no hepatomegaly present , liver nontender , spleen not palpable, No Ascites, No hernia

## 2023-06-30 NOTE — HPI-TODAY'S VISIT:
3-4 loose bm per day which is his baseline likely due to multiple resections. Not using fiber or imodium Abdominal pain most days, crampy and lasts 5-10 minutes roughly twice a day. Erratic not necessarily related to bowel habits.

## 2023-06-30 NOTE — PHYSICAL EXAM NEUROLOGIC:
oriented to person, place and time , normal sensation , short and long term memory intact Dorsal Nasal Flap Text: The defect edges were debeveled with a #15 scalpel blade.  Given the location of the defect and the proximity to free margins a dorsal nasal flap was deemed most appropriate.  Using a sterile surgical marker, an appropriate dorsal nasal flap was drawn around the defect.    The area thus outlined was incised deep to adipose tissue with a #15 scalpel blade.  The skin margins were undermined to an appropriate distance in all directions utilizing iris scissors.

## 2023-07-07 ENCOUNTER — LAB OUTSIDE AN ENCOUNTER (OUTPATIENT)
Dept: URBAN - METROPOLITAN AREA CLINIC 63 | Facility: CLINIC | Age: 53
End: 2023-07-07

## 2023-07-18 ENCOUNTER — CLAIMS CREATED FROM THE CLAIM WINDOW (OUTPATIENT)
Dept: URBAN - METROPOLITAN AREA CLINIC 4 | Facility: CLINIC | Age: 53
End: 2023-07-18
Payer: COMMERCIAL

## 2023-07-18 ENCOUNTER — OUT OF OFFICE VISIT (OUTPATIENT)
Dept: URBAN - METROPOLITAN AREA SURGERY CENTER 4 | Facility: SURGERY CENTER | Age: 53
End: 2023-07-18
Payer: COMMERCIAL

## 2023-07-18 DIAGNOSIS — K50.10 CROHN'S DISEASE OF LARGE INTESTINE WITHOUT COMPLICATION: ICD-10-CM

## 2023-07-18 DIAGNOSIS — K62.89 OTHER SPECIFIED DISEASES OF ANUS AND RECTUM: ICD-10-CM

## 2023-07-18 DIAGNOSIS — Z12.11 COLON CANCER SCREENING (HIGH RISK): ICD-10-CM

## 2023-07-18 DIAGNOSIS — K62.4 STENOSIS OF ANUS AND RECTUM: ICD-10-CM

## 2023-07-18 DIAGNOSIS — D12.8 ADENOMATOUS POLYP OF RECTUM: ICD-10-CM

## 2023-07-18 DIAGNOSIS — K62.1 RECTAL POLYP: ICD-10-CM

## 2023-07-18 DIAGNOSIS — K63.89 OTHER SPECIFIED DISEASES OF INTESTINE: ICD-10-CM

## 2023-07-18 DIAGNOSIS — K64.8 OTHER HEMORRHOIDS: ICD-10-CM

## 2023-07-18 DIAGNOSIS — K50.80 CROHN'S COLITIS: ICD-10-CM

## 2023-07-18 PROCEDURE — 45380 COLONOSCOPY AND BIOPSY: CPT | Performed by: INTERNAL MEDICINE

## 2023-07-18 PROCEDURE — 45385 COLONOSCOPY W/LESION REMOVAL: CPT | Performed by: INTERNAL MEDICINE

## 2023-07-18 PROCEDURE — 88305 TISSUE EXAM BY PATHOLOGIST: CPT | Performed by: PATHOLOGY

## 2023-07-18 PROCEDURE — 00811 ANES LWR INTST NDSC NOS: CPT | Performed by: NURSE ANESTHETIST, CERTIFIED REGISTERED

## 2023-07-18 RX ORDER — DICYCLOMINE HYDROCHLORIDE 20 MG/1
THREE TIMES A DAY AS NEEDED FOR ABDOMINAL PAIN TABLET ORAL THREE TIMES A DAY
Refills: 3 | Status: ACTIVE | COMMUNITY
Start: 2021-11-17

## 2023-07-18 RX ORDER — TIZANIDINE 2 MG/1
TABLET ORAL ONCE A DAY
Refills: 0 | Status: ACTIVE | COMMUNITY
Start: 2022-01-07

## 2023-07-18 RX ORDER — VITAM B12 100 MCG
TAB ORAL ONCE A DAY
Refills: 0 | Status: ACTIVE | COMMUNITY
Start: 2022-01-07

## 2023-07-18 RX ORDER — MELOXICAM 15 MG/1
ONCE A DAY TABLET ORAL ONCE A DAY
Refills: 0 | Status: ACTIVE | COMMUNITY
Start: 2017-03-08

## 2023-07-18 RX ORDER — FERROUS SULFATE 325(65) MG
1 TABLET ORAL
Qty: 0 | Refills: 2 | Status: ON HOLD | COMMUNITY
Start: 2012-09-19

## 2023-07-18 RX ORDER — TRAMADOL HYDROCHLORIDE 50 MG/1
TABLET ORAL AS NEEDED
Refills: 0 | Status: ACTIVE | COMMUNITY
Start: 2022-01-07

## 2023-07-18 RX ORDER — USTEKINUMAB 90 MG/ML
USE AS DIRECTEDINJ 1-90MG PFS SC EVERY 4  WEEKS INJECTION, SOLUTION SUBCUTANEOUS
Refills: 6 | Status: ACTIVE | COMMUNITY
Start: 2022-01-11

## 2023-07-18 RX ORDER — LOSARTAN POTASSIUM 25 MG/1
TAKE 1 TABLET BY MOUTH EVERY DAY TABLET, FILM COATED ORAL
Qty: 90 EACH | Refills: 0 | Status: ACTIVE | COMMUNITY

## 2023-08-07 ENCOUNTER — LAB OUTSIDE AN ENCOUNTER (OUTPATIENT)
Dept: URBAN - METROPOLITAN AREA CLINIC 63 | Facility: CLINIC | Age: 53
End: 2023-08-07

## 2023-08-08 ENCOUNTER — LAB OUTSIDE AN ENCOUNTER (OUTPATIENT)
Dept: URBAN - METROPOLITAN AREA CLINIC 63 | Facility: CLINIC | Age: 53
End: 2023-08-08

## 2023-08-11 ENCOUNTER — OFFICE VISIT (OUTPATIENT)
Dept: URBAN - METROPOLITAN AREA CLINIC 63 | Facility: CLINIC | Age: 53
End: 2023-08-11
Payer: COMMERCIAL

## 2023-08-11 VITALS
SYSTOLIC BLOOD PRESSURE: 140 MMHG | TEMPERATURE: 98.1 F | WEIGHT: 207.8 LBS | OXYGEN SATURATION: 97 % | DIASTOLIC BLOOD PRESSURE: 80 MMHG | HEART RATE: 92 BPM | HEIGHT: 72 IN | BODY MASS INDEX: 28.15 KG/M2

## 2023-08-11 DIAGNOSIS — K50.10 CROHN'S DISEASE OF LARGE INTESTINE WITHOUT COMPLICATION: ICD-10-CM

## 2023-08-11 DIAGNOSIS — Z92.25 PERSONAL HISTORY OF IMMUNOSUPPRESSION THERAPY: ICD-10-CM

## 2023-08-11 DIAGNOSIS — K62.4 ANAL STRICTURE: ICD-10-CM

## 2023-08-11 DIAGNOSIS — K50.00 CROHN'S DISEASE OF ILEUM WITHOUT COMPLICATION: ICD-10-CM

## 2023-08-11 LAB — CALPROTECTIN, FECAL: 7

## 2023-08-11 PROCEDURE — 99214 OFFICE O/P EST MOD 30 MIN: CPT | Performed by: NURSE PRACTITIONER

## 2023-08-11 RX ORDER — VITAM B12 100 MCG
TAB ORAL ONCE A DAY
Refills: 0 | Status: ACTIVE | COMMUNITY
Start: 2022-01-07

## 2023-08-11 RX ORDER — TRAMADOL HYDROCHLORIDE 50 MG/1
TABLET ORAL AS NEEDED
Refills: 0 | Status: ACTIVE | COMMUNITY
Start: 2022-01-07

## 2023-08-11 RX ORDER — MELOXICAM 15 MG/1
ONCE A DAY TABLET ORAL ONCE A DAY
Refills: 0 | Status: ACTIVE | COMMUNITY
Start: 2017-03-08

## 2023-08-11 RX ORDER — DICYCLOMINE HYDROCHLORIDE 20 MG/1
THREE TIMES A DAY AS NEEDED FOR ABDOMINAL PAIN TABLET ORAL THREE TIMES A DAY
Refills: 3 | Status: ACTIVE | COMMUNITY
Start: 2021-11-17

## 2023-08-11 RX ORDER — TIZANIDINE 2 MG/1
TABLET ORAL ONCE A DAY
Refills: 0 | Status: ACTIVE | COMMUNITY
Start: 2022-01-07

## 2023-08-11 RX ORDER — LOSARTAN POTASSIUM 25 MG/1
TAKE 1 TABLET BY MOUTH EVERY DAY TABLET, FILM COATED ORAL
Qty: 90 EACH | Refills: 0 | Status: ACTIVE | COMMUNITY

## 2023-08-11 RX ORDER — USTEKINUMAB 90 MG/ML
USE AS DIRECTEDINJ 1-90MG PFS SC EVERY 4  WEEKS INJECTION, SOLUTION SUBCUTANEOUS
Refills: 6 | Status: ACTIVE | COMMUNITY
Start: 2022-01-11

## 2023-08-11 NOTE — HPI-TODAY'S VISIT:
Colonoscopy showed no active disease and no adenomas. Generally doing well, having 3-4 loose stools per day on normal/good days. 1-2 days per month he has rectal pain/burning with loose stools. Uses preparation H prn. Having 7-8 bm on bad days, usually 3-4 days per month. Not using any imodium.

## 2023-08-11 NOTE — HPI-CROHN'S DISEASE
Diagnosed in  Last Colonoscopy:   2023 Anal stricture, erythema in sigmoid and rectum but all pathology normal throughout colon  2021 with us with mild ileitis with 4 erosions.   2018 with [facility], normal colon and danae TI, on Entyvio  some scar tissue at anorectum  Current IBD Meds: stelara 90 mg every 4 weeks  Prior IBD Meds: Asacol Remicade caused joint pains(probably due to ADAB) Humira with high antibody levels and failure to respond Received only 2 dose of entyvio and was stopped due to low platelets, his platelets have always run at the bottom end of normal per our labs.   Steroid use/response: budesonide end of   Most recent imagin2019 CTE shows mild hyperemia in the rectum, possible proctitis. Given hx of scarring at anorectum on last colonoscopy this explains the hyperemia.  Extraintestinal manifestations: none  Inflammatory Markers: 2022 crp wnl  Required  biologic testin2022 quant gold and hep b wnl  Risk factors for severe disease: young age at dx, prior surgery, male sex and small bowel disease  IBD surgical history:  resection 20 yrs ago and second surgery 1/15 for stricture and recurrent SBO  Personal history of cancer:  no  Cardiac history: none

## 2023-08-12 LAB
A/G RATIO: 1.9
ALBUMIN: 4.1
ALKALINE PHOSPHATASE: 70
ALT (SGPT): 29
AMBIG ABBREV CMP14 DEFAULT: (no result)
AST (SGOT): 19
BASO (ABSOLUTE): 0.1
BASOS: 1
BILIRUBIN, TOTAL: 0.8
BUN/CREATININE RATIO: 12
BUN: 12
C-REACTIVE PROTEIN, QUANT: 1
CALCIUM: 8.8
CARBON DIOXIDE, TOTAL: 28
CHLORIDE: 100
CREATININE: 1.04
EGFR: 86
EOS (ABSOLUTE): 0
EOS: 0
GLOBULIN, TOTAL: 2.2
GLUCOSE: 85
HEMATOCRIT: 49.1
HEMATOLOGY COMMENTS:: (no result)
HEMOGLOBIN: 16.6
HEP B CORE AB, IGM: NEGATIVE
HEP B SURFACE AB, QUAL: NON REACTIVE
IMMATURE CELLS: (no result)
IMMATURE GRANS (ABS): 0
IMMATURE GRANULOCYTES: 0
LYMPHS (ABSOLUTE): 1.3
LYMPHS: 26
MCH: 30.3
MCHC: 33.8
MCV: 90
MONOCYTES(ABSOLUTE): 0.3
MONOCYTES: 6
NEUTROPHILS (ABSOLUTE): 3.4
NEUTROPHILS: 67
NRBC: (no result)
PLATELETS: 127
POTASSIUM: 4.5
PROTEIN, TOTAL: 6.3
QUANTIFERON CRITERIA: (no result)
QUANTIFERON INCUBATION: (no result)
QUANTIFERON MITOGEN VALUE: >10
QUANTIFERON NIL VALUE: 0.04
QUANTIFERON TB1 AG VALUE: 0.05
QUANTIFERON TB2 AG VALUE: 0.05
QUANTIFERON-TB GOLD PLUS: NEGATIVE
RBC: 5.48
RDW: 12.7
SODIUM: 141
VITAMIN D, 25-HYDROXY: 25
WBC: 5.1

## 2024-01-13 ENCOUNTER — LAB OUTSIDE AN ENCOUNTER (OUTPATIENT)
Dept: URBAN - METROPOLITAN AREA CLINIC 63 | Facility: CLINIC | Age: 54
End: 2024-01-13

## 2024-01-16 LAB — C-REACTIVE PROTEIN, QUANT: 2

## 2024-05-28 ENCOUNTER — OFFICE VISIT (OUTPATIENT)
Dept: URBAN - METROPOLITAN AREA CLINIC 60 | Facility: CLINIC | Age: 54
End: 2024-05-28
Payer: MEDICARE

## 2024-05-28 ENCOUNTER — DASHBOARD ENCOUNTERS (OUTPATIENT)
Age: 54
End: 2024-05-28

## 2024-05-28 ENCOUNTER — OFFICE VISIT (OUTPATIENT)
Dept: URBAN - METROPOLITAN AREA CLINIC 60 | Facility: CLINIC | Age: 54
End: 2024-05-28

## 2024-05-28 ENCOUNTER — LAB OUTSIDE AN ENCOUNTER (OUTPATIENT)
Dept: URBAN - METROPOLITAN AREA CLINIC 60 | Facility: CLINIC | Age: 54
End: 2024-05-28

## 2024-05-28 VITALS
OXYGEN SATURATION: 100 % | DIASTOLIC BLOOD PRESSURE: 84 MMHG | TEMPERATURE: 98.6 F | HEIGHT: 72 IN | RESPIRATION RATE: 12 BRPM | SYSTOLIC BLOOD PRESSURE: 138 MMHG | BODY MASS INDEX: 26.52 KG/M2 | HEART RATE: 76 BPM | WEIGHT: 195.8 LBS

## 2024-05-28 DIAGNOSIS — R74.8 ELEVATED LIVER ENZYMES: ICD-10-CM

## 2024-05-28 DIAGNOSIS — K50.10 CROHN'S DISEASE OF LARGE INTESTINE WITHOUT COMPLICATION: ICD-10-CM

## 2024-05-28 DIAGNOSIS — K62.4 ANAL STRICTURE: ICD-10-CM

## 2024-05-28 DIAGNOSIS — Z92.25 PERSONAL HISTORY OF IMMUNOSUPPRESSION THERAPY: ICD-10-CM

## 2024-05-28 PROCEDURE — 99214 OFFICE O/P EST MOD 30 MIN: CPT | Performed by: INTERNAL MEDICINE

## 2024-05-28 RX ORDER — ONDANSETRON HYDROCHLORIDE 4 MG/1
1 TABLET TABLET, FILM COATED ORAL ONCE A DAY
Qty: 30 | Refills: 3 | Status: ACTIVE | COMMUNITY
Start: 2024-02-16

## 2024-05-28 RX ORDER — TRAMADOL HYDROCHLORIDE 50 MG/1
TABLET ORAL AS NEEDED
Refills: 0 | COMMUNITY
Start: 2022-01-07

## 2024-05-28 RX ORDER — LOSARTAN POTASSIUM 25 MG/1
TAKE 1 TABLET BY MOUTH EVERY DAY TABLET, FILM COATED ORAL
Qty: 90 EACH | Refills: 0 | Status: ACTIVE | COMMUNITY

## 2024-05-28 RX ORDER — USTEKINUMAB 90 MG/ML
USE AS DIRECTEDINJ 1-90MG PFS SC EVERY 4  WEEKS INJECTION, SOLUTION SUBCUTANEOUS
Refills: 6 | Status: ACTIVE | COMMUNITY
Start: 2022-01-11

## 2024-05-28 RX ORDER — MELOXICAM 15 MG/1
ONCE A DAY TABLET ORAL ONCE A DAY
Refills: 0 | COMMUNITY
Start: 2017-03-08

## 2024-05-28 RX ORDER — LOSARTAN POTASSIUM 25 MG/1
TAKE 1 TABLET BY MOUTH EVERY DAY TABLET, FILM COATED ORAL
Qty: 90 EACH | Refills: 0 | COMMUNITY

## 2024-05-28 RX ORDER — VITAM B12 100 MCG
TAB ORAL ONCE A DAY
Refills: 0 | COMMUNITY
Start: 2022-01-07

## 2024-05-28 RX ORDER — DICYCLOMINE HYDROCHLORIDE 20 MG/1
THREE TIMES A DAY AS NEEDED FOR ABDOMINAL PAIN TABLET ORAL THREE TIMES A DAY
Refills: 3 | Status: ACTIVE | COMMUNITY
Start: 2021-11-17

## 2024-05-28 RX ORDER — USTEKINUMAB 90 MG/ML
USE AS DIRECTEDINJ 1-90MG PFS SC EVERY 4  WEEKS INJECTION, SOLUTION SUBCUTANEOUS
Refills: 6 | COMMUNITY
Start: 2022-01-11

## 2024-05-28 RX ORDER — VITAM B12 100 MCG
TAB ORAL ONCE A DAY
Refills: 0 | Status: ACTIVE | COMMUNITY
Start: 2022-01-07

## 2024-05-28 RX ORDER — DICYCLOMINE HYDROCHLORIDE 20 MG/1
THREE TIMES A DAY AS NEEDED FOR ABDOMINAL PAIN TABLET ORAL THREE TIMES A DAY
Refills: 3 | COMMUNITY
Start: 2021-11-17

## 2024-05-28 RX ORDER — TIZANIDINE 2 MG/1
TABLET ORAL ONCE A DAY
Refills: 0 | Status: ACTIVE | COMMUNITY
Start: 2022-01-07

## 2024-05-28 RX ORDER — TIZANIDINE 2 MG/1
TABLET ORAL ONCE A DAY
Refills: 0 | COMMUNITY
Start: 2022-01-07

## 2024-05-28 RX ORDER — MELOXICAM 15 MG/1
ONCE A DAY TABLET ORAL ONCE A DAY
Refills: 0 | Status: ACTIVE | COMMUNITY
Start: 2017-03-08

## 2024-05-28 RX ORDER — TRAMADOL HYDROCHLORIDE 50 MG/1
TABLET ORAL AS NEEDED
Refills: 0 | Status: ACTIVE | COMMUNITY
Start: 2022-01-07

## 2024-07-05 ENCOUNTER — LAB OUTSIDE AN ENCOUNTER (OUTPATIENT)
Dept: URBAN - METROPOLITAN AREA CLINIC 63 | Facility: CLINIC | Age: 54
End: 2024-07-05

## 2024-07-06 LAB
ALBUMIN: 4.1
ALKALINE PHOSPHATASE: 76
ALT (SGPT): 38
AST (SGOT): 18
BILIRUBIN, TOTAL: 1
BUN/CREATININE RATIO: 9
BUN: 9
C-REACTIVE PROTEIN, QUANT: <1
CALCIUM: 9.2
CARBON DIOXIDE, TOTAL: 27
CHLORIDE: 103
CREATININE: 1.05
EGFR: 85
GLOBULIN, TOTAL: 2.4
GLUCOSE: 90
POTASSIUM: 4.5
PROTEIN, TOTAL: 6.5
SODIUM: 144

## 2024-08-01 ENCOUNTER — LAB OUTSIDE AN ENCOUNTER (OUTPATIENT)
Dept: URBAN - METROPOLITAN AREA CLINIC 63 | Facility: CLINIC | Age: 54
End: 2024-08-01

## 2025-05-20 ENCOUNTER — P2P PATIENT RECORD (OUTPATIENT)
Age: 55
End: 2025-05-20

## 2025-05-22 ENCOUNTER — TELEPHONE ENCOUNTER (OUTPATIENT)
Dept: URBAN - METROPOLITAN AREA CLINIC 60 | Facility: CLINIC | Age: 55
End: 2025-05-22

## 2025-05-27 ENCOUNTER — LAB OUTSIDE AN ENCOUNTER (OUTPATIENT)
Dept: URBAN - METROPOLITAN AREA CLINIC 63 | Facility: CLINIC | Age: 55
End: 2025-05-27

## 2025-05-27 ENCOUNTER — OFFICE VISIT (OUTPATIENT)
Dept: URBAN - METROPOLITAN AREA CLINIC 63 | Facility: CLINIC | Age: 55
End: 2025-05-27

## 2025-05-27 RX ORDER — TIZANIDINE 2 MG/1
TABLET ORAL ONCE A DAY
Refills: 0 | Status: ACTIVE | COMMUNITY
Start: 2022-01-07

## 2025-05-27 RX ORDER — USTEKINUMAB 90 MG/ML
USE AS DIRECTEDINJ 1-90MG PFS SC EVERY 4  WEEKS INJECTION, SOLUTION SUBCUTANEOUS
Refills: 6 | Status: ACTIVE | COMMUNITY
Start: 2022-01-11

## 2025-05-27 RX ORDER — DICYCLOMINE HYDROCHLORIDE 20 MG/1
THREE TIMES A DAY AS NEEDED FOR ABDOMINAL PAIN TABLET ORAL THREE TIMES A DAY
Refills: 3 | Status: ACTIVE | COMMUNITY
Start: 2021-11-17

## 2025-05-27 RX ORDER — LOSARTAN POTASSIUM 25 MG/1
TAKE 1 TABLET BY MOUTH EVERY DAY TABLET, FILM COATED ORAL
Qty: 90 EACH | Refills: 0 | Status: ACTIVE | COMMUNITY

## 2025-05-27 RX ORDER — ONDANSETRON HYDROCHLORIDE 4 MG/1
1 TABLET TABLET, FILM COATED ORAL ONCE A DAY
Qty: 30 | Refills: 3 | Status: ACTIVE | COMMUNITY
Start: 2024-02-16

## 2025-05-27 RX ORDER — VITAM B12 100 MCG
TAB ORAL ONCE A DAY
Refills: 0 | Status: ACTIVE | COMMUNITY
Start: 2022-01-07

## 2025-05-27 RX ORDER — MELOXICAM 15 MG/1
ONCE A DAY TABLET ORAL ONCE A DAY
Refills: 0 | Status: ACTIVE | COMMUNITY
Start: 2017-03-08

## 2025-05-27 RX ORDER — TRAMADOL HYDROCHLORIDE 50 MG/1
TABLET ORAL AS NEEDED
Refills: 0 | Status: ACTIVE | COMMUNITY
Start: 2022-01-07

## 2025-05-27 NOTE — HPI-PREVIOUS PROCEDURES
Colonoscopy-Dr. Pierson 2023: Anal stricture, normal neoterminal ileum, normal anastomosis, mild erythema in the rectum and the sigmoid colon. Biopsies taken in the transverse colon, descending colon, sigmoid and rectum showed no significant abnormality. Hyperplastic rectal polyps and pathology. Grade 2 hemorrhoids-recall colonoscopy 2 years

## 2025-05-27 NOTE — HPI-PREVIOUS IMAGING
Ultrasound abdomen complete May 2024: Diffusely echogenic liver liver span 16.1 cm Doppler study noted patency of hepatic and portal veins, cholelithiasis noted, gallbladder wall thickness 2.1 mm, CBD 3.3 mm, normal kidneys, normal spleen   CT scan abdomen and pelvis with CT enterography also normal July 2023: No evidence of cirrhosis or any suspicious enhancing lesions, gallbladder contains a few small stones, normal caliber bile ducts, normal pancreas, normal spleen, normal stomach and small bowel. No evidence of bowel obstruction or strictures. Normal colon. Evidence of prior resection and ileocolonic anastomosis. Mesenteric lymph nodes are not abundant in number to suggest ongoing inflammation..

## 2025-05-27 NOTE — HPI-TODAY'S VISIT:
Patient is a very pleasant 55-year-old male who presents for Crohns follow up. He is a patient of Dr. Pierson. Last seen 5/28/2024. Past medical history significant for anal stricturing, Crohn's disease, elevated LFTs. Past surgical history significant for status post right hemicolectomy. Last colonoscopy 7/18/2023.    patient was diagnosed with UC 1994. Most recent colonoscopy 2023 with remission. Currently on Stelara every 4 weeks. Prior previously failed Asacol, Remicade (caused joint pains), Humira with high antibody levels. To respond, Entyvio but discontinued due to low platelets. Previously responded to budesonide 9/20/2021. Plans hep B and TB 2022. Risk for severe disease include young age of diagnosis, prior surgery, male sex, small bowel disease. IBD surgical history significant for resection 20 years ago and secondary surgery in 2015 for stricturing and recurrent SBO.  LAST OV: Guillermo is here today in follow-up. He was asked to be seen here after his liver enzymes were noted to be elevated in January with AST and ALT 3-4 times normal. Subsequently his liver enzymes have started to come down with his AST normalizing and April but his ALT persist to be elevated. Ultrasound noted evidence of fatty liver disease but normal ducts and and cholelithiasis. He reports that a few months ago he did experience some right upper quadrant pain that radiated to his back. Symptoms lasted for about a day and resolved. Denies any associated bloating or nausea or fevers. He is on Stelara with clinical and endoscopic remission of his inflammatory bowel disease. He does not recall being on any antibiotics in the past several weeks. He is currently asymptomatic. Reports no abdominal pain or nausea. Denies any diarrhea rectal bleeding or melena. He is currently on vitamin D and vitamin B12 supplementation.   Colonoscopy 2023 showed no active disease and no adenomas.  TODAY 05/27/2025  Patient presents for follow up. He admits he had about 3 weeks ago abdominal

## 2025-05-27 NOTE — HPI-PREVIOUS LABS
Labs April 2024: Normal total bilirubin, AST 37,  (H), hepatitis C negative, hepatitis B negative,  Labs January 2024: C-reactive protein 2, Labs January 2024: WBC 4.7, hemoglobin 15.8 g MCV 90, platelets 131 (L), BUN 10 creatinine 0.87, sodium 145, Labs January 2024: Total bilirubin 0.6 AST//110, alkaline phosphatase 72, B12 273, folate 5.5, free testosterone 4.4 (L), vitamin D28.7 (L), normal TSH  August 2023: Fecal calprotectin 7, C-reactive protein 1, vitamin D 25 (L) TB QuantiFERON negative, normal CMP, normal CBC except for platelet count 127 (L)

## 2025-06-27 ENCOUNTER — TELEPHONE ENCOUNTER (OUTPATIENT)
Dept: URBAN - METROPOLITAN AREA CLINIC 63 | Facility: CLINIC | Age: 55
End: 2025-06-27

## 2025-07-02 ENCOUNTER — P2P PATIENT RECORD (OUTPATIENT)
Age: 55
End: 2025-07-02

## 2025-08-04 ENCOUNTER — TELEPHONE ENCOUNTER (OUTPATIENT)
Dept: URBAN - METROPOLITAN AREA CLINIC 60 | Facility: CLINIC | Age: 55
End: 2025-08-04

## 2025-08-04 RX ORDER — DICYCLOMINE HYDROCHLORIDE 20 MG/1
THREE TIMES A DAY AS NEEDED FOR ABDOMINAL PAIN TABLET ORAL THREE TIMES A DAY
Qty: 320 | Refills: 3
Start: 2021-11-17

## 2025-08-04 RX ORDER — ONDANSETRON HYDROCHLORIDE 4 MG/1
1 TABLET TABLET, FILM COATED ORAL ONCE A DAY
Qty: 30 | Refills: 3
Start: 2024-02-16

## 2025-08-26 ENCOUNTER — CLAIMS CREATED FROM THE CLAIM WINDOW (OUTPATIENT)
Dept: URBAN - METROPOLITAN AREA SURGERY CENTER 4 | Facility: SURGERY CENTER | Age: 55
End: 2025-08-26
Payer: COMMERCIAL

## 2025-08-26 ENCOUNTER — CLAIMS CREATED FROM THE CLAIM WINDOW (OUTPATIENT)
Dept: URBAN - METROPOLITAN AREA CLINIC 4 | Facility: CLINIC | Age: 55
End: 2025-08-26
Payer: COMMERCIAL

## 2025-08-26 ENCOUNTER — CLAIMS CREATED FROM THE CLAIM WINDOW (OUTPATIENT)
Dept: URBAN - METROPOLITAN AREA SURGERY CENTER 4 | Facility: SURGERY CENTER | Age: 55
End: 2025-08-26

## 2025-08-26 DIAGNOSIS — K62.4 ANAL STRICTURE: ICD-10-CM

## 2025-08-26 DIAGNOSIS — K63.89 OTHER SPECIFIED DISEASES OF INTESTINE: ICD-10-CM

## 2025-08-26 DIAGNOSIS — K64.1 SECOND DEGREE HEMORRHOIDS: ICD-10-CM

## 2025-08-26 DIAGNOSIS — K58.9 IRRITABLE BOWEL SYNDROME, UNSPECIFIED: ICD-10-CM

## 2025-08-26 DIAGNOSIS — K52.3 INDETERMINATE COLITIS: ICD-10-CM

## 2025-08-26 PROCEDURE — 88305 TISSUE EXAM BY PATHOLOGIST: CPT | Performed by: PATHOLOGY

## 2025-08-26 PROCEDURE — 45380 COLONOSCOPY AND BIOPSY: CPT | Performed by: INTERNAL MEDICINE

## 2025-08-26 RX ORDER — USTEKINUMAB 90 MG/ML
USE AS DIRECTEDINJ 1-90MG PFS SC EVERY 4  WEEKS INJECTION, SOLUTION SUBCUTANEOUS
Refills: 6 | Status: ACTIVE | COMMUNITY
Start: 2022-01-11

## 2025-08-26 RX ORDER — MELOXICAM 15 MG/1
ONCE A DAY TABLET ORAL ONCE A DAY
Refills: 0 | Status: ACTIVE | COMMUNITY
Start: 2017-03-08

## 2025-08-26 RX ORDER — LOSARTAN POTASSIUM 25 MG/1
TAKE 1 TABLET BY MOUTH EVERY DAY TABLET, FILM COATED ORAL
Qty: 90 EACH | Refills: 0 | Status: ACTIVE | COMMUNITY

## 2025-08-26 RX ORDER — TIZANIDINE 2 MG/1
TABLET ORAL ONCE A DAY
Refills: 0 | Status: ACTIVE | COMMUNITY
Start: 2022-01-07

## 2025-08-26 RX ORDER — TRAMADOL HYDROCHLORIDE 50 MG/1
TABLET ORAL AS NEEDED
Refills: 0 | Status: ACTIVE | COMMUNITY
Start: 2022-01-07

## 2025-08-26 RX ORDER — VITAM B12 100 MCG
TAB ORAL ONCE A DAY
Refills: 0 | Status: ACTIVE | COMMUNITY
Start: 2022-01-07

## 2025-08-26 RX ORDER — DICYCLOMINE HYDROCHLORIDE 20 MG/1
THREE TIMES A DAY AS NEEDED FOR ABDOMINAL PAIN TABLET ORAL THREE TIMES A DAY
Qty: 320 | Refills: 3 | Status: ACTIVE | COMMUNITY
Start: 2021-11-17

## 2025-08-26 RX ORDER — ONDANSETRON HYDROCHLORIDE 4 MG/1
1 TABLET TABLET, FILM COATED ORAL ONCE A DAY
Qty: 30 | Refills: 3 | Status: ACTIVE | COMMUNITY
Start: 2024-02-16